# Patient Record
Sex: FEMALE | Race: BLACK OR AFRICAN AMERICAN | NOT HISPANIC OR LATINO | Employment: UNEMPLOYED | URBAN - METROPOLITAN AREA
[De-identification: names, ages, dates, MRNs, and addresses within clinical notes are randomized per-mention and may not be internally consistent; named-entity substitution may affect disease eponyms.]

---

## 2023-12-01 ENCOUNTER — HOSPITAL ENCOUNTER (EMERGENCY)
Facility: HOSPITAL | Age: 23
Discharge: HOME | End: 2023-12-01
Payer: COMMERCIAL

## 2023-12-01 VITALS
BODY MASS INDEX: 34.36 KG/M2 | RESPIRATION RATE: 18 BRPM | OXYGEN SATURATION: 100 % | HEART RATE: 103 BPM | WEIGHT: 240 LBS | SYSTOLIC BLOOD PRESSURE: 127 MMHG | DIASTOLIC BLOOD PRESSURE: 89 MMHG | HEIGHT: 70 IN | TEMPERATURE: 98.6 F

## 2023-12-01 DIAGNOSIS — Z32.01 POSITIVE URINE PREGNANCY TEST (HHS-HCC): Primary | ICD-10-CM

## 2023-12-01 LAB — PREGNANCY TEST URINE, POC: POSITIVE

## 2023-12-01 PROCEDURE — 99283 EMERGENCY DEPT VISIT LOW MDM: CPT

## 2023-12-01 PROCEDURE — 99283 EMERGENCY DEPT VISIT LOW MDM: CPT | Performed by: PHYSICIAN ASSISTANT

## 2023-12-01 RX ORDER — ACETAMINOPHEN 500 MG
1000 TABLET ORAL EVERY 8 HOURS PRN
Qty: 30 TABLET | Refills: 0 | Status: SHIPPED | OUTPATIENT
Start: 2023-12-01 | End: 2023-12-11

## 2023-12-01 ASSESSMENT — COLUMBIA-SUICIDE SEVERITY RATING SCALE - C-SSRS
2. HAVE YOU ACTUALLY HAD ANY THOUGHTS OF KILLING YOURSELF?: NO
6. HAVE YOU EVER DONE ANYTHING, STARTED TO DO ANYTHING, OR PREPARED TO DO ANYTHING TO END YOUR LIFE?: NO
1. IN THE PAST MONTH, HAVE YOU WISHED YOU WERE DEAD OR WISHED YOU COULD GO TO SLEEP AND NOT WAKE UP?: NO

## 2023-12-02 NOTE — ED TRIAGE NOTES
Pt to ED stating she wants a pregnancy test and stating her menstrual cycle is 20 days late. When pt asked why she didn't take a pregnancy test at home, she said she wanted one done here. Pt is denying any current symptoms.

## 2023-12-02 NOTE — ED PROVIDER NOTES
Emergency Department Encounter  [facility] EMERGENCY MEDICINE    Patient: Ena Ridley  MRN: [id number]  : 2000  Date of Evaluation: 2023  ED Provider: Ena Matute PA-C      Chief Complaint       Chief Complaint   Patient presents with    pregnancy test     HPI    Ena Ridley is a 23 y.o. female who presents to the emergency department presenting for pregnancy test.  Patient states that her cycle is approximately 20 days late.  , FDLMP approximately 5 weeks ago.  Patient has not yet taken a home pregnancy test.  She denies any associated abdominal and/or pelvic pain, dysuria or hematuria, changes in urinary frequency or urgency, vaginal bleeding or discharge.    ROS:     Review of Systems  14 systems reviewed and otherwise acutely negative except as in the HPI.    Past History   No past medical history on file.  No past surgical history on file.  Social History     Socioeconomic History    Marital status: Single     Spouse name: Not on file    Number of children: Not on file    Years of education: Not on file    Highest education level: Not on file   Occupational History    Not on file   Tobacco Use    Smoking status: Not on file    Smokeless tobacco: Not on file   Substance and Sexual Activity    Alcohol use: Not on file    Drug use: Not on file    Sexual activity: Not on file   Other Topics Concern    Not on file   Social History Narrative    Not on file     Social Determinants of Health     Financial Resource Strain: Not on file   Food Insecurity: Not on file   Transportation Needs: Not on file   Physical Activity: Not on file   Stress: Not on file   Social Connections: Not on file   Intimate Partner Violence: Not on file   Housing Stability: Not on file       Medications/Allergies     Previous Medications    ACYCLOVIR (ZOVIRAX) 400 MG TABLET    Take 1 tablet (400 mg) by mouth 3 times a day.    BUPROPION XL (WELLBUTRIN XL) 150 MG 24 HR TABLET    Take 1 tablet (150 mg) by  "mouth once daily in the morning. Take before meals.    BUPROPION XL (WELLBUTRIN XL) 300 MG 24 HR TABLET    Take 1 tablet (300 mg) by mouth once daily in the morning. Take before meals.    METOCLOPRAMIDE (REGLAN) 10 MG TABLET    take 1 tablet by mouth four times a day if needed for nausea    VALACYCLOVIR (VALTREX) 500 MG TABLET    Take 1 tablet (500 mg) by mouth once daily.    VENLAFAXINE XR (EFFEXOR-XR) 150 MG 24 HR CAPSULE    Take 2 capsules (300 mg) by mouth once daily.     Allergies   Allergen Reactions    Fish Containing Products Unknown    Nickel Rash        Physical Exam       ED Triage Vitals [12/01/23 1928]   Temp Heart Rate Resp BP   37 °C (98.6 °F) 103 18 127/89      SpO2 Temp src Heart Rate Source Patient Position   100 % -- -- --      BP Location FiO2 (%)     -- --         Physical Exam    Physical Exam:    VS: As documented in the triage note and EMR flowsheet from this visit were reviewed.    Appearance: Alert, oriented, cooperative, in no acute distress. Well nourished & well hydrated.    Skin: Warm and dry.     Neck: Supple.    Pulmonary: Clear bilaterally with good chest wall excursion. No rales, rhonchi or wheezing. No accessory muscle use or stridor.     Cardiac: Normal S1, S2    Abdomen: Soft, nontender, active bowel sounds.     : Exam deferred.    Musculoskeletal: Spontaneously moving all extremities without limitation. Extremities warm and well-perfused.    Diagnostics   Labs:  Results for orders placed or performed during the hospital encounter of 12/01/23   POCT pregnancy, urine   Result Value Ref Range    Preg Test, Ur Positive (A) Negative       ED Course   Visit Vitals  /89   Pulse 103   Temp 37 °C (98.6 °F)   Resp 18   Ht 1.778 m (5' 10\")   Wt 109 kg (240 lb)   SpO2 100%   BMI 34.44 kg/m²   BSA 2.32 m²     Medications - No data to display    Medical Decision Making     Diagnoses as of 12/01/23 2007   Positive urine pregnancy test   Urine preg positive. No other symptoms requiring " further testing. Follow up with OB, Rx for tylenol and daily prenatals.      Final Impression      1. Positive urine pregnancy test          DISPOSITION  Disposition: discharge  Patient condition is: Stable    Comment: Please note this report has been produced using speech recognition software and may contain errors related to that system including errors in grammar, punctuation, and spelling, as well as words and phrases that may be inappropriate.  If there are any questions or concerns please feel free to contact the dictating provider for clarification.    RAJ Stark PA-C  12/01/23 2012

## 2024-03-26 ENCOUNTER — DOCUMENTATION (OUTPATIENT)
Dept: SURGERY | Facility: HOSPITAL | Age: 24
End: 2024-03-26
Payer: COMMERCIAL

## 2024-03-26 ENCOUNTER — TELEPHONE (OUTPATIENT)
Dept: SURGERY | Facility: CLINIC | Age: 24
End: 2024-03-26
Payer: COMMERCIAL

## 2024-03-26 NOTE — TELEPHONE ENCOUNTER
Patient wanting to transfer from  to  for Bariatrics.  Patient will register on line and will call once she completes it.  Insurance verification will be completed at that time

## 2024-04-26 PROBLEM — O36.80X0 PREGNANCY OF UNKNOWN ANATOMIC LOCATION (HHS-HCC): Status: ACTIVE | Noted: 2024-04-26

## 2024-04-26 PROBLEM — F41.9 ANXIETY: Status: ACTIVE | Noted: 2024-04-26

## 2024-04-26 PROBLEM — B00.9 HSV (HERPES SIMPLEX VIRUS) INFECTION: Status: ACTIVE | Noted: 2022-09-23

## 2024-04-26 PROBLEM — O36.5930 POOR FETAL GROWTH AFFECTING MANAGEMENT OF MOTHER IN THIRD TRIMESTER (HHS-HCC): Status: ACTIVE | Noted: 2019-08-02

## 2024-04-26 PROBLEM — F33.1 MODERATE EPISODE OF RECURRENT MAJOR DEPRESSIVE DISORDER (MULTI): Chronic | Status: ACTIVE | Noted: 2022-09-13

## 2024-04-26 PROBLEM — N90.7 LABIAL CYST: Status: ACTIVE | Noted: 2021-11-30

## 2024-04-26 PROBLEM — F32.A DEPRESSION: Status: ACTIVE | Noted: 2019-01-28

## 2024-04-26 PROBLEM — M25.50 JOINT PAIN: Status: ACTIVE | Noted: 2024-04-26

## 2024-04-26 PROBLEM — E66.01 MORBID OBESITY (MULTI): Status: ACTIVE | Noted: 2024-04-26

## 2024-04-26 PROBLEM — Z86.19 HISTORY OF SYPHILIS: Status: ACTIVE | Noted: 2021-11-30

## 2024-04-26 PROBLEM — E28.2 PCOS (POLYCYSTIC OVARIAN SYNDROME): Status: ACTIVE | Noted: 2024-04-26

## 2024-04-26 PROBLEM — R45.851 SUICIDAL IDEATION: Status: ACTIVE | Noted: 2023-06-05

## 2024-04-26 NOTE — PROGRESS NOTES
Subjective     Date: 5/3/2024 Time: 9:11 AM  Name: Ena Ridley  MRN: [id number]    This is a 24 y.o. female with morbid obesity (Body mass index is 40.65 kg/m².) who presents to clinic for consideration of bariatric surgery. she has attempted and failed multiple diet and exercise regimens for weight loss. Initial Onset of obesity was in childhood, and after pregnancy 4yrs ago.  Their goal for surgery is to  be healthier  and lose weight. The patient has tried multiple diets to lose weight including Medications , Low Calorie, and pediatric weight loss program 12-13yo . The patient was most successful with the  pediatric WL program . The most pounds lost on this diet were 10 lbs. The patient considers their dietary weakness to be  none; lack of exercise  The patient reports a  highest weight ever of 285 pounds and lowest weight ever of 180 pounds Distribution of Obesity: is general. The patient exercises daily  45-50 Minutes/Day Types of Exercise : walking      Patient Active Problem List   Diagnosis    Positive urine pregnancy test (HHS-HCC)    Anxiety    Depression    History of syphilis    HSV (herpes simplex virus) infection    Joint pain    Labial cyst    Moderate episode of recurrent major depressive disorder (Multi)    Morbid obesity (Multi)    PCOS (polycystic ovarian syndrome)    Poor fetal growth affecting management of mother in third trimester (HHS-HCC)    Pregnancy of unknown anatomic location (HHS-HCC)    Suicidal ideation    Psychological factors affecting medical condition    Preop examination       SLEEVE Gastric Surgery For Morbid Obesity Laparoscopic Longitudinal Gastrectomy     0 = No symptoms    PMH: No past medical history on file.     PSH:   Past Surgical History:   Procedure Laterality Date    NO PAST SURGERIES          denies personal/family hx of VTE.    FAMILY HISTORY:  No family history on file.     SOCIAL HISTORY:  Social History     Tobacco Use    Smoking status: Never    Smokeless  tobacco: Never   Vaping Use    Vaping status: Never Used   Substance Use Topics    Alcohol use: Not Currently    Drug use: Never       MEDICATIONS:  Prior to Admission Medications:  Medication Documentation Review Audit       Reviewed by Haley Kent CMA (Medical Assistant) on 05/03/24 at 0848      Medication Order Taking? Sig Documenting Provider Last Dose Status   acyclovir (Zovirax) 400 mg tablet [id number] Yes Take 1 tablet (400 mg) by mouth 3 times a day. John Hines MD Taking Active   buPROPion XL (Wellbutrin XL) 150 mg 24 hr tablet [id number] No Take 1 tablet (150 mg) by mouth once daily in the morning. Take before meals. John Hines MD Not Taking Active   buPROPion XL (Wellbutrin XL) 300 mg 24 hr tablet [id number] No Take 1 tablet (300 mg) by mouth once daily in the morning. Take before meals. John Hines MD Not Taking Active   cyanocobalamin (Vitamin B-12) 1,000 mcg tablet [id number] Yes Take 1 tablet (1,000 mcg) by mouth once daily. Historical MD Flora Taking Active   Daily-Briana, with folic acid, 400 mcg tablet [id number] Yes  Historical MD Flora Taking Active   docusate sodium (Colace) 100 mg capsule [id number] Yes Take 1 capsule (100 mg) by mouth once daily. Historical MD Flora Taking Active   ferrous sulfate ER (Slow Iron) 140 (45 Fe) MG ER tablet [id number] Yes Take 140 mg by mouth once daily. Historical MD Flora Taking Active   fluconazole (Diflucan) 150 mg tablet [id number] No  Historical MD Flora Not Taking Active   levonorgestrel (Plan B) 1.5 mg tablet [id number] No TAKE 1 TABLET BY MOUTH ONCE FOR 1 DOSE, TAKE ORALLY WITH 72 HOURS OF UNPROTECTED INTERCOURSE Historical MD Flora Not Taking Active   lidocaine 4 % patch [id number] No  Historical MD Flora Not Taking Active   medroxyPROGESTERone 150 mg/mL injection [id number] Yes Inject 1 mL (150 mg) into the muscle. Historical MD Flora Taking Active   metoclopramide (Reglan) 10 mg tablet [id number] No take 1  "tablet by mouth four times a day if needed for nausea Historical Provider, MD Not Taking Active   tretinoin (Retin-A) 0.025 % cream [id number] Yes Apply topically. Historical Provider, MD Taking Active   valACYclovir (Valtrex) 500 mg tablet [id number] Yes Take 1 tablet (500 mg) by mouth once daily. Historical Provider, MD Taking Active   venlafaxine XR (Effexor-XR) 150 mg 24 hr capsule [id number] No Take 2 capsules (300 mg) by mouth once daily. Historical Provider, MD Not Taking Active                     ALLERGIES:  Allergies   Allergen Reactions    Fish Containing Products Unknown    Nickel Rash       REVIEW OF SYSTEMS:  GENERAL: Negative for malaise, significant weight loss and fever  HEAD: Negative for headache, swelling.  NECK: Negative for lumps, goiter, pain and significant neck swelling  RESPIRATORY: Negative for cough, wheezing or shortness of breath.  CARDIOVASCULAR: Negative for chest pain, leg swelling or palpitations.  GI: Negative for abdominal discomfort, blood in stools or black stools or change in bowel habits  : No history of dysuria, frequency or incontinence  MUSCULOSKELETAL: Negative for joint pain or swelling, back pain or muscle pain.  SKIN: Negative for lesions, rash, and itching.  PSYCH: Negative for sleep disturbance, mood disorder and recent psychosocial stressors.  ENDOCRINE: Negative for cold or heat intolerance, polyuria, polydipsia and goiter.    Objective   PHYSICAL EXAM:  Visit Vitals  /80 (BP Location: Left arm, Patient Position: Sitting, BP Cuff Size: Large adult)   Pulse 86   Ht 1.765 m (5' 9.5\")   Wt 127 kg (279 lb 4.8 oz)   SpO2 98%   BMI 40.65 kg/m²   OB Status Injection   Smoking Status Never   BSA 2.5 m²     General appearance: obese, NAD  Neuro: AOx3  Head: EOMI; no swelling or lesions of scalp or face  ENT:  no lumps or lymphadenopathy, thyroid normal to palpation; oropharynx clear, no swelling or erythema  Skin: warm, no erythema or rashes  Lungs: clear to percussion " and auscultation  Heart: regular rhythm and S1, S2 normal  Abdomen: soft, non-tender, no masses, no organomegaly  Extremities: Normal exam of the extremities. No swelling or pain.  Psych: no hurried speech, no flight of ideas, normal affect    Assessment/Plan   IMPRESSION:  Ena Ridley is a 24 y.o. female with a BMI of Body mass index is 40.65 kg/m². with the following diagnoses and co-morbidities:     No past medical history on file.    This patient does meet the criteria for a surgical weight loss procedure according to NIH guidelines.    The risks of sleeve gastrectomy, Je-en-Y gastric bypass including but not limited to bleeding, leak along staple lines, infection, dehydration, ulcers, internal hernia, DVT/PE, pneumonia, myocardial infarction, prolonged nausea/vomiting, incomplete resolution of associated medical conditions, reflux, weight regain, vitamin/mineral deficiencies, and death have been explained to the patient and Ena Ridley has expressed understanding and acceptance of them.       She wants to proceed with SG.       We discussed the lifestyle changes necessary to be successful following surgery.    The increased risk of substance and alcohol abuse following bariatric surgery was discussed with the patient, along with the negative consequences of substance/alcohol use after surgery including addiction, worsening of mental health disorders, and injury to the stomach. The risk of smoking and vaping (tobacco or any other substance) after bariatric surgery was explained to the patient. This includes risk of anastamotic ulcers, gastritis, bleeding, perforation, stricture, and PO intolerance.  The patient expressed understanding and acceptance of these risks.    The patient was advised not to become pregnant within 12-18 months following bariatric surgery. She was educated on the increased risks to mother and fetus associated with pregnancy within 2 years of bariatric surgery.    The benefits of the  above surgeries including weight loss, improvement/resolution of associated medical and mental health conditions, improved mobility, and decreased mortality have been explained the the patient and Ena Ridley has expressed understanding and acceptance of them.      PLAN:  The plan of treatment for Ena Ridley is to continue with the consultations and tests ordered today in hopes of qualifying for pre-operative clearance for bariatric surgery. This includes:    Consult Nutrition for education and monthly visits or MSWL classes   Consult Psychology - your clearance will  24  Consult Cardiology   Consult Pulmonology  Labs ordered -some labs were not ordered by [facility]   EGD -completed 24  PCP for medical optimization  Consult sleep medicine - concern for NICHOLAS  Recommend at least 5-10 lbs of weight loss prior to surgery.

## 2024-04-26 NOTE — PATIENT INSTRUCTIONS
The following are some lifestyle changes you should begin to prepare you for your surgery.   Eliminate soda and other carbonated beverages from your diet. Carbonation will not be well tolerated after surgery. Try Propel, Vitamin Water Zero, Sobe Lifewater, Crystal Light or water.    Increase fluid consumption to 64 oz daily. Do not drink within 30 minutes of eating as this will liquefy your food and make you hungry more quickly.    Exercise for 30-60 minutes daily. Brisk walking, bike riding and swimming are all examples of healthy exercise. If you are unable to exercise we recommend seated exercise.    Do not skip meals.    Take a multivitamin daily.    Lose weight. In preparation for your surgery it is important that you begin making healthier food choices now. Our dietitian will meet with you to help you select foods lower in calories and higher in nutrition. We would like you to lose at least 5-10 lbs prior to surgery.     Increase your protein intake to 60 grams per day.    Alcohol is empty calories. Please eliminate while preparing for surgery.    Plan your meals.      General Instruction:   1) Use the information we gave you today to work through your insurance requirements and medical clearances.   2) These documents need to get faxed or emailed to the program navigators so they can submit them for approval from your insurance company.   3) Obtain labs today at a  facility. We will call you with any abnormalities and corrections you need to make.   4) Continue to work with your primary care doctor and other specialist so your other health problems are well controlled prior to your surgery.   5) Adopt the recommendations of the program dietician so you develop healthy eating patterns.   6) Work with the sleep team to get your sleep apnea treated to prevent other health problems .   7) Consider attending a support group to learn from other who have been through the process.   8) Come to the  MSWL sessions.      back/upper

## 2024-04-29 PROBLEM — Z01.818 PREOP EXAMINATION: Status: ACTIVE | Noted: 2024-04-29

## 2024-04-29 PROBLEM — F54 PSYCHOLOGICAL FACTORS AFFECTING MEDICAL CONDITION: Status: ACTIVE | Noted: 2024-04-29

## 2024-05-03 ENCOUNTER — LAB (OUTPATIENT)
Dept: LAB | Facility: LAB | Age: 24
End: 2024-05-03
Payer: COMMERCIAL

## 2024-05-03 ENCOUNTER — OFFICE VISIT (OUTPATIENT)
Dept: SURGERY | Facility: CLINIC | Age: 24
End: 2024-05-03
Payer: COMMERCIAL

## 2024-05-03 ENCOUNTER — NUTRITION (OUTPATIENT)
Dept: SURGERY | Facility: CLINIC | Age: 24
End: 2024-05-03
Payer: COMMERCIAL

## 2024-05-03 VITALS
OXYGEN SATURATION: 98 % | HEIGHT: 70 IN | WEIGHT: 279.3 LBS | HEART RATE: 86 BPM | BODY MASS INDEX: 39.99 KG/M2 | DIASTOLIC BLOOD PRESSURE: 80 MMHG | SYSTOLIC BLOOD PRESSURE: 114 MMHG

## 2024-05-03 DIAGNOSIS — Z71.3 PRE-BARIATRIC SURGERY NUTRITION EVALUATION: ICD-10-CM

## 2024-05-03 DIAGNOSIS — E66.01 MORBID (SEVERE) OBESITY DUE TO EXCESS CALORIES (MULTI): ICD-10-CM

## 2024-05-03 DIAGNOSIS — Z98.84 BARIATRIC SURGERY STATUS: ICD-10-CM

## 2024-05-03 DIAGNOSIS — Z13.21 ENCOUNTER FOR VITAMIN DEFICIENCY SCREENING: ICD-10-CM

## 2024-05-03 DIAGNOSIS — E66.01 MORBID OBESITY WITH BMI OF 40.0-44.9, ADULT (MULTI): Primary | ICD-10-CM

## 2024-05-03 DIAGNOSIS — E28.2 PCOS (POLYCYSTIC OVARIAN SYNDROME): ICD-10-CM

## 2024-05-03 DIAGNOSIS — E66.01 MORBID OBESITY (MULTI): ICD-10-CM

## 2024-05-03 PROCEDURE — 36415 COLL VENOUS BLD VENIPUNCTURE: CPT

## 2024-05-03 PROCEDURE — 84630 ASSAY OF ZINC: CPT

## 2024-05-03 PROCEDURE — 80323 ALKALOIDS NOS: CPT

## 2024-05-03 PROCEDURE — 99204 OFFICE O/P NEW MOD 45 MIN: CPT | Performed by: SURGERY

## 2024-05-03 PROCEDURE — 3008F BODY MASS INDEX DOCD: CPT | Performed by: SURGERY

## 2024-05-03 PROCEDURE — 84590 ASSAY OF VITAMIN A: CPT

## 2024-05-03 PROCEDURE — 1036F TOBACCO NON-USER: CPT | Performed by: SURGERY

## 2024-05-03 PROCEDURE — 99214 OFFICE O/P EST MOD 30 MIN: CPT | Performed by: SURGERY

## 2024-05-03 PROCEDURE — 82525 ASSAY OF COPPER: CPT

## 2024-05-03 NOTE — PROGRESS NOTES
Surgeon: Vidya  Patient is considering:   sleeve    ASSESSMENT:  Current weight: 279.0  Ht: 70.0  in  BMI:  40.65        Initial start weight:   275.0   9/1/23  at   Pre-Op Excess Body Weight (EBW):    105.0  Target Post-Op weight goal:     210.7-226.5    Food allergies/intolerances: +[lactose and eggs intolerant  Chewing/Swallowing/Dentition:  none   Nausea / Vomiting / Hx Gastroparesis:   none  Diarrhea/ Constipation:   none  Exercise level:  walking for 40-50 min 5x/week  Smoking/Tobacco use: none  Vitamins/Minerals supplements:   MVI, Fe, B12  Past diet attempts:  Adipex, low calorie, RD monitored. Adolescent weight loss program  Hours of sleep/night:  7    24 HOUR RECALL/DIET HISTORY:  Breakfast:   turkey and cheese  Snack:   mandarin oranges  Lunch:   kale salad w/grilled veggies  Snack:   none  Dinner:  none  Snack:  none  Beverages:   64 oz water/day  Alcohol:  none    Person responsible for cooking & shopping?   Pt and grandma do both  How often do you eat sweet snacks?  2 mini chocolates 1-2x/week  How often do you eat savory snacks?    3x/week eats pork rinds  How often do you eat out?    1x/month  Do you feel overly stuffed?   no  Binge Eating?    no  Night Eating?  Yes, wakes up around 2 and eat pork rinds.  Emotional Eating?    no       READINESS TO LEARN:  Motivation to learn: Interested        Understanding of instruction: Good   Anticipated Compliance: Good      Family Support: Pt states mom and grandma are supportive.        Educational Materials Provided:    Pre-op Diet and sample menus                                             Nutrition Guidelines for Gastric Bypass and Sleeve Gastrectomy   Schedules for MSWL class and support group   1500 Calorie meal plan   Goals sheet      Nutrition assessment completed today.  Pt will be scheduled for a video education class at a later date to discuss the 2 week pre op diet, post op protein and fluid goals, vitamin and mineral supplementation, exercise  goals, and post op diet progression.     Patient is seekingsleeve gastrectomy  surgery    Pt has transferred over from [facility] program.  Her start weight was 275  pounds on 9/1/23 and her last weight was 273 pounds on 3/26/24.      Pt is unemployed.   She has had a weight issues since childhood.  She lives with her mother and her son  Pt has completed 6 months of medically supervised weight loss.  Her weight at her last visit was 273 on 3/26/24    Review of her diet shows skipped meals, low intake  of protein, fruit and vegetables.     REcommend following  1500 calorie meal plan.  Recommend eating 3 meals that include 3-4 oz protein and 1-2 high protein snacks . Discussed meal and snack ideas.   Reviewed the postop behaviors to start practicing.  Set a goal to add some strength and toning exercises to her routine.     Patient was receptive to nutritional recommendations, asked numerous questions, and verbalized understanding of the weight loss surgery diet.  Patient expressed understanding about the importance of strict dietary compliance post-surgery to avoid nutritional deficiencies and achieve optimal weight loss and verbalized intent to follow dietary recommendations.    Malnutrition Screening:  Significant unintentional weight loss? n/a   Eating less than 75% of usual intake for more than 2 weeks? n/a      Nutrition Diagnosis:   1. Overweight/obesity related to excess energy intake as evidenced by BMI = 40 kg/m^2.  2. Food- and nutrition-related knowledge deficit related to lack of prior exposure to surgical weight loss information as evidenced by pt new to surgical program.    Nutrition Interventions:   1. Modify type and amount of food and nutrients within meals and snacks.  2. Comprehensive Nutrition Education    Recommendations:  1. Begin following your meal plan.  Measure and record intake daily.   2. Structure meal patterns, eating three meals and 1-2 snacks per day.  3. Aim for 3-4 oz protein  per meal.  Have 1-2 high protein snacks that are 10-20 g protein each.  You can try a tuna or chicken packet, Greek yogurt, 2 string cheeses, Protein bars like Quest, Pure Protein, Premier, or Built Bars. you can also try protein chips form Quest or Atkins.    4. Drink 64oz of calorie-free, caffeine-free, and non-carbonated beverages.   5. Practice no drinking 30 minutes before meals, nothing with meals and wait 30 minutes after meals to drink again. Make meals last 30 minutes-chew thoroughly.   6. Limit or omit eating out/sweets/savory snacks to 1-2 times per week.  7. Increase physical activity by 10-15 minutes as tolerated to an end goal of 60 minutes 5 x per week. Consistency is the key.  Try some of the exercise videos in your emial.   9. Start the pre-op diet 2 weeks before surgery and follow the post-op diet progression after surgery    Pre-op Goal weight: lose 5% of body weight    Nutrition Monitoring and Evaluation: 1-2 pound weight loss per week  Criteria: weight check  Need for Follow-up:     Patient does meet National Institutes Health guidelines for weight loss surgery, however needs to demonstrate consistent effort in making dietary changes before giving clearance. It is anticipated that the patient will need at least 1-2  nutritional follow-up visits prior to clearance for surgery.

## 2024-05-06 LAB
COPPER SERPL-MCNC: 116.8 UG/DL (ref 80–155)
ZINC SERPL-MCNC: 55.7 UG/DL (ref 60–120)

## 2024-05-07 ENCOUNTER — APPOINTMENT (OUTPATIENT)
Dept: CARDIOLOGY | Facility: CLINIC | Age: 24
End: 2024-05-07
Payer: COMMERCIAL

## 2024-05-07 PROBLEM — Z98.84 HISTORY OF BARIATRIC SURGERY: Status: ACTIVE | Noted: 2024-05-07

## 2024-05-07 PROBLEM — O03.9 SPONTANEOUS ABORTION (HHS-HCC): Status: ACTIVE | Noted: 2024-05-07

## 2024-05-07 PROBLEM — U07.1 DISEASE DUE TO SEVERE ACUTE RESPIRATORY SYNDROME CORONAVIRUS 2 (SARS-COV-2): Status: ACTIVE | Noted: 2022-09-23

## 2024-05-08 LAB
ANNOTATION COMMENT IMP: NORMAL
RETINYL PALMITATE SERPL-MCNC: <0.02 MG/L (ref 0–0.1)
VIT A SERPL-MCNC: 0.4 MG/L (ref 0.3–1.2)

## 2024-05-09 ENCOUNTER — OFFICE VISIT (OUTPATIENT)
Dept: SLEEP MEDICINE | Facility: CLINIC | Age: 24
End: 2024-05-09
Payer: COMMERCIAL

## 2024-05-09 DIAGNOSIS — R53.83 FATIGUE, UNSPECIFIED TYPE: ICD-10-CM

## 2024-05-09 DIAGNOSIS — E66.01 MORBID OBESITY (MULTI): ICD-10-CM

## 2024-05-09 DIAGNOSIS — Z98.84 BARIATRIC SURGERY STATUS: ICD-10-CM

## 2024-05-09 DIAGNOSIS — Z01.818 PREOPERATIVE CLEARANCE: ICD-10-CM

## 2024-05-09 DIAGNOSIS — G47.19 EXCESSIVE DAYTIME SLEEPINESS: Primary | ICD-10-CM

## 2024-05-09 PROBLEM — D50.9 IRON DEFICIENCY ANEMIA: Status: ACTIVE | Noted: 2024-05-09

## 2024-05-09 LAB
COTININE SERPL-MCNC: <5 NG/ML
NICOTINE SERPL-MCNC: <5 NG/ML

## 2024-05-09 PROCEDURE — 3008F BODY MASS INDEX DOCD: CPT | Performed by: PSYCHIATRY & NEUROLOGY

## 2024-05-09 PROCEDURE — G2211 COMPLEX E/M VISIT ADD ON: HCPCS | Performed by: PSYCHIATRY & NEUROLOGY

## 2024-05-09 PROCEDURE — 99204 OFFICE O/P NEW MOD 45 MIN: CPT | Performed by: PSYCHIATRY & NEUROLOGY

## 2024-05-09 PROCEDURE — 1036F TOBACCO NON-USER: CPT | Performed by: PSYCHIATRY & NEUROLOGY

## 2024-05-09 ASSESSMENT — SLEEP AND FATIGUE QUESTIONNAIRES
HOW LIKELY ARE YOU TO NOD OFF OR FALL ASLEEP WHILE SITTING QUIETLY AFTER LUNCH WITHOUT ALCOHOL: WOULD NEVER DOZE
HOW LIKELY ARE YOU TO NOD OFF OR FALL ASLEEP WHILE SITTING AND READING: HIGH CHANCE OF DOZING
HOW LIKELY ARE YOU TO NOD OFF OR FALL ASLEEP WHILE SITTING AND TALKING TO SOMEONE: WOULD NEVER DOZE
ESS-CHAD TOTAL SCORE: 11
HOW LIKELY ARE YOU TO NOD OFF OR FALL ASLEEP IN A CAR, WHILE STOPPED FOR A FEW MINUTES IN TRAFFIC: WOULD NEVER DOZE
HOW LIKELY ARE YOU TO NOD OFF OR FALL ASLEEP WHEN YOU ARE A PASSENGER IN A CAR FOR AN HOUR WITHOUT A BREAK: MODERATE CHANCE OF DOZING
HOW LIKELY ARE YOU TO NOD OFF OR FALL ASLEEP WHILE WATCHING TV: HIGH CHANCE OF DOZING
SITING INACTIVE IN A PUBLIC PLACE LIKE A CLASS ROOM OR A MOVIE THEATER: WOULD NEVER DOZE
HOW LIKELY ARE YOU TO NOD OFF OR FALL ASLEEP WHILE LYING DOWN TO REST IN THE AFTERNOON WHEN CIRCUMSTANCES PERMIT: HIGH CHANCE OF DOZING

## 2024-05-09 ASSESSMENT — PATIENT HEALTH QUESTIONNAIRE - PHQ9
2. FEELING DOWN, DEPRESSED OR HOPELESS: NOT AT ALL
SUM OF ALL RESPONSES TO PHQ9 QUESTIONS 1 AND 2: 0
1. LITTLE INTEREST OR PLEASURE IN DOING THINGS: NOT AT ALL

## 2024-05-09 NOTE — PROGRESS NOTES
Patient: Ena Ridley    [id number]  : 2000 -- AGE 24 y.o.    Provider: Nirmal Sparks MD     Location [facility]   Service Date: 2024              [facility]  New Visit Note      Virtual or Telephone Consent  An interactive audio and video telecommunication system which permits real time communications between the patient (at the originating site) and provider (at the distant site) was utilized to provide this telehealth service.   Verbal consent was requested and obtained from Ena Ridley on this date, 24 for a telehealth visit.   I verified the patient's identity and physical location in [place].  If this is a new patient to me, I informed the patient of my name and type of active [place] license that I hold.      The patient's referring provider is: Gabriel Dasilva MD     HPI:  Ena Ridley is a 24 y.o. female with PMH notable for morbid obesity, iron deficiency anemia, PCOS, COVID-19, anxiety, and depression, who is going through the bariatric surgery process and is here today as a referral for surgical clearance.     Her bariatric surgeon ordered a split night PSG on 24. It has not been scheduled yet.    She has no sleep complaints, but has had sleepiness and fatigue for the last few years. No worsening over time or known exacerbating/relieving factors. Thinks may be related to her PCOS and iron deficiency. Prone to dozing if sitting down and is mentally idle.    NIGHTTIME SYMPTOMS:   Snoring: No  Witnessed apnea: No  Nocturnal gasping: No  Nocturnal choking: No  Sleep walking: No  Sleep talking:  Yes  Dream enactment: No  Nocturnal GERD: No  Morning headaches: No  Morning dry mouth/sore throat: No  Nocturia: No  Restless sleep: No, but not restorative  Sleep paralysis: No  Hypnagogic/hypnopompic hallucinations: No    DAYTIME SYMPTOMS  Shell:   Daytime sleepiness: Frequent  Fatigue: Yes  Feeling sleepy while driving: Denies    RLS symptoms: No    Cataplexy: No    SLEEP HABITS:   Preferred sleep position: left side  Bedtime: 11 pm, sleep latency 5-10 minutes  Wake time: 830-9 am  # of nocturnal awakenings: generally none   Napping: a few times per week for 2-3 hours. Napping is refreshing  Total estimated sleep per 24 hrs: 9-12 hours    PRIOR SLEEP STUDIES:  Had a sleep study around  - reportedly negative for sleep apnea. Study done because she was overweight.    PRIOR TREATMENTS:  No stimulants or sleep aids.    Patient Active Problem List   Diagnosis    Positive urine pregnancy test ([facility])    Anxiety    Depression    History of syphilis    HSV (herpes simplex virus) infection    Joint pain    Labial cyst    Moderate episode of recurrent major depressive disorder ([facility])    Morbid obesity ([facility])    PCOS (polycystic ovarian syndrome)    Poor fetal growth affecting management of mother in third trimester ([facility])    Pregnancy of unknown anatomic location ([facility])    Suicidal ideation    Psychological factors affecting medical condition    Preop examination    History of bariatric surgery    Disease due to severe acute respiratory syndrome coronavirus 2 (SARS-CoV-2)    Spontaneous  ([facility])     Past Surgical History:   Procedure Laterality Date    NO PAST SURGERIES       Current Outpatient Medications   Medication Sig Dispense Refill    acyclovir (Zovirax) 400 mg tablet Take 1 tablet (400 mg) by mouth 3 times a day.      buPROPion XL (Wellbutrin XL) 150 mg 24 hr tablet Take 1 tablet (150 mg) by mouth once daily in the morning. Take before meals.      buPROPion XL (Wellbutrin XL) 300 mg 24 hr tablet Take 1 tablet (300 mg) by mouth once daily in the morning. Take before meals.      cyanocobalamin (Vitamin B-12) 1,000 mcg tablet Take 1 tablet (1,000 mcg) by mouth once daily.      Daily-Briana, with folic acid, 400 mcg tablet       docusate sodium (Colace) 100 mg capsule Take 1 capsule (100 mg) by mouth once daily.      ferrous sulfate ER (Slow  "Iron) 140 (45 Fe) MG ER tablet Take 140 mg by mouth once daily.      fluconazole (Diflucan) 150 mg tablet       levonorgestrel (Plan B) 1.5 mg tablet TAKE 1 TABLET BY MOUTH ONCE FOR 1 DOSE, TAKE ORALLY WITH 72 HOURS OF UNPROTECTED INTERCOURSE      lidocaine 4 % patch       medroxyPROGESTERone 150 mg/mL injection Inject 1 mL (150 mg) into the muscle.      metoclopramide (Reglan) 10 mg tablet take 1 tablet by mouth four times a day if needed for nausea      tretinoin (Retin-A) 0.025 % cream Apply topically.      valACYclovir (Valtrex) 500 mg tablet Take 1 tablet (500 mg) by mouth once daily.      venlafaxine XR (Effexor-XR) 150 mg 24 hr capsule Take 2 capsules (300 mg) by mouth once daily.       No current facility-administered medications for this visit.     Allergies   Allergen Reactions    Fish Containing Products Unknown    Nickel Rash       FAMILY HISTORY OF SLEEP DISORDERS: None that she knows of.  Family History   Problem Relation Name Age of Onset    Hypertension Mother      Obesity Mother      Heart disease Father         SOCIAL HISTORY  Employment: currently unemployed  Lives with: son and mother  Alcohol: none  Cigarettes: never  Illicits: none  Caffeine: none     ROS: 12 point ROS positive only for fatigue and blurred vision - seeing an eye doctor for new glasses next week.    PHYSICAL EXAMINATION:   Height 5' 9\", approximate weight 275 lbs  General: Awake. Alert. Comfortable. No apparent distress.   Speech: Normal  Comprehension: Normal  Mood: Stable  Affect: Appropriate  Eyes:   Eyelids: normal            ENT:          Unable to assess patency of nasal passageways or for presence of nasal septum deviation. . Tongue scalloping is not present, tongue is enlarged, soft palate is not elongated, hard palate is not high arched. Uvula is not enlarged. Retrognathia is not present. Tonsils are not enlarged. Dentition excellent.           Neck:          Circumference: increased in caliber  Cardiac: unable to " assess pulses or cardiac rate/rhythm.   Pul:          Normal respiratory effort   Abd:         obese  Neuro: Alert, well-oriented. Cranial nerves II-XII grossly normal and symmetric.   No abnormal movements noted.      LABS/DIAGNOSTICS:  Lab Results   Component Value Date    HGB 10.9 (L) 08/21/2022    CO2 23 08/21/2022    TSH 0.66 08/24/2022    HGBA1C 5.1 02/22/2024        Echo: none on file    PFTs: none on file      ASSESSMENT AND PLAN: Ms. Ena Ridley is a 24 y.o. female with a history of morbid obesity, iron deficiency anemia, PCOS, COVID-19, anxiety, and depression, who is going through the bariatric surgery process. Her sleep-related complaint is daytime sleepiness/fatigue, which could be related to her anemia, depression, possibly her PCOS, and possibly she has sleep apnea. Due to the perioperative risks associated with untreated sleep apnea, we will have her follow through with sleep testing. She is agreeable to using CPAP if needed.    #Excessive daytime sleepiness  #fatigue  #morbid obesity  #preoperative clearance  #bariatric surgery status  -We discussed the risk factors for sleep apnea, pathophysiology of sleep apnea, treatment options, and potential long-term complications of untreated NICHOLAS, including cardiovascular and metabolic complications. We will start evaluation with a split night in-lab sleep test. Her surgeon ordered it - she will call to schedule it      All of the above was discussed with the patient in detail. She voiced an understanding of the above and was agreeable to proceed further as advised. Procedure for the sleep study was discussed with her.    FOLLOW UP:  After study to discuss results - pt to schedule her sleep study then will schedule with us for follow-up 2-3 weeks after the study for results/next steps.

## 2024-05-31 ENCOUNTER — OFFICE VISIT (OUTPATIENT)
Dept: CARDIOLOGY | Facility: CLINIC | Age: 24
End: 2024-05-31
Payer: COMMERCIAL

## 2024-05-31 VITALS
OXYGEN SATURATION: 97 % | WEIGHT: 282 LBS | DIASTOLIC BLOOD PRESSURE: 90 MMHG | BODY MASS INDEX: 38.19 KG/M2 | HEIGHT: 72 IN | HEART RATE: 94 BPM | SYSTOLIC BLOOD PRESSURE: 128 MMHG

## 2024-05-31 DIAGNOSIS — Z01.810 PREOPERATIVE CARDIOVASCULAR EXAMINATION: ICD-10-CM

## 2024-05-31 DIAGNOSIS — R06.02 SHORTNESS OF BREATH: Primary | ICD-10-CM

## 2024-05-31 DIAGNOSIS — Z98.84 BARIATRIC SURGERY STATUS: ICD-10-CM

## 2024-05-31 PROCEDURE — 93000 ELECTROCARDIOGRAM COMPLETE: CPT | Performed by: STUDENT IN AN ORGANIZED HEALTH CARE EDUCATION/TRAINING PROGRAM

## 2024-05-31 PROCEDURE — 3008F BODY MASS INDEX DOCD: CPT | Performed by: STUDENT IN AN ORGANIZED HEALTH CARE EDUCATION/TRAINING PROGRAM

## 2024-05-31 PROCEDURE — 99204 OFFICE O/P NEW MOD 45 MIN: CPT | Performed by: STUDENT IN AN ORGANIZED HEALTH CARE EDUCATION/TRAINING PROGRAM

## 2024-05-31 NOTE — PROGRESS NOTES
Referred by Dr. Dasilva for New Patient Visit (Cardiac Clearance, Bariatric Surgery)     History Of Present Illness:    Ena Ridley is a 24 y.o. female presents to clinic for cardiovascular evaluation for planned bariatric surgery.  Patient is undergoing evaluation for bariatric sleeve and is here for cardiovascular evaluation.  She denies symptoms of chest pain and occasionally has some shortness of breath if she goes up multiple flights of stairs.  She has no prior history of cardiovascular disease.  No history of diabetes.  Family history of heart disease apparently her father had heart surgery at a early age.  She is never had surgeries before.  No previous complications.  Baseline ECG of normal sinus rhythm normal axis and appropriately progression.      Past Medical History:  She has no past medical history on file.    Past Surgical History:  She has a past surgical history that includes No past surgeries.      Social History:  She reports that she has never smoked. She has never used smokeless tobacco. She reports that she does not currently use alcohol. She reports that she does not use drugs.    Family History:  Family History   Problem Relation Name Age of Onset    Hypertension Mother      Obesity Mother      Heart disease Father          Allergies:  Egg, Fish containing products, and Nickel    Outpatient Medications:  Current Outpatient Medications   Medication Instructions    acyclovir (ZOVIRAX) 400 mg, oral, 3 times daily    cyanocobalamin (VITAMIN B-12) 1,000 mcg, oral, Daily RT    Daily-Briana, with folic acid, 400 mcg tablet     docusate sodium (COLACE) 100 mg, oral, Daily RT    ferrous sulfate ER (SLOW IRON) 140 mg, oral, Daily RT    fluconazole (Diflucan) 150 mg tablet     levonorgestrel (Plan B) 1.5 mg tablet TAKE 1 TABLET BY MOUTH ONCE FOR 1 DOSE, TAKE ORALLY WITH 72 HOURS OF UNPROTECTED INTERCOURSE    lidocaine 4 % patch     medroxyPROGESTERone (DEPO-PROVERA) 150 mg, intramuscular    metoclopramide  (Reglan) 10 mg tablet take 1 tablet by mouth four times a day if needed for nausea    tretinoin (Retin-A) 0.025 % cream Topical    valACYclovir (VALTREX) 500 mg, oral, Daily RT    venlafaxine XR (EFFEXOR-XR) 300 mg, oral, Daily        Last Recorded Vitals:  Vitals:    05/31/24 1107   BP: 128/90   BP Location: Left arm   Patient Position: Sitting   BP Cuff Size: Adult   Pulse: 94   SpO2: 97%   Weight: 128 kg (282 lb)   Height: 1.829 m (6')       Physical Exam:  General: No acute distress,  A&O x3  Skin: Warm and dry  Neck: JVD is not elevated  ENT: Moist mucous membranes no lesions appreciated  Pulmonary: CTAB  Cards: Regular rate rhythm, no murmurs gallops or rubs appreciated normal S1-S2  Abdomen: Soft nontender nondistended  Extremities: No edema or cyanosis  Psych: Appropriate mood and affect     Last Labs:  CBC -  Lab Results   Component Value Date    WBC 8.1 08/21/2022    HGB 10.9 (L) 08/21/2022    HCT 35.5 (L) 08/21/2022    MCV 77 (L) 08/21/2022     08/21/2022       CMP -  Lab Results   Component Value Date    CALCIUM 9.0 08/21/2022    PROT 7.5 08/21/2022    ALBUMIN 3.9 08/21/2022    AST 11 08/21/2022    ALT 7 08/21/2022    ALKPHOS 75 08/21/2022    BILITOT 0.2 08/21/2022       LIPID PANEL -   Lab Results   Component Value Date    CHOL 103 08/24/2022    TRIG 27 (LL) 08/24/2022    HDL 41.3 08/24/2022    CHHDL 2.5 08/24/2022    LDLF 56 08/24/2022    VLDL 5 08/24/2022    NHDL 62 08/24/2022       RENAL FUNCTION PANEL -   Lab Results   Component Value Date    GLUCOSE 115 (H) 08/21/2022     08/21/2022    K 3.8 08/21/2022     08/21/2022    CO2 23 08/21/2022    ANIONGAP 13 08/21/2022    BUN 11 08/21/2022    CREATININE 0.65 08/21/2022    CALCIUM 9.0 08/21/2022    ALBUMIN 3.9 08/21/2022        Lab Results   Component Value Date    HGBA1C 5.1 02/22/2024       Last Cardiology Tests:  ECG:  No results found for this or any previous visit from the past 1095 days.    Assessment/Plan     1.  Cardiovascular  risk assessment for planned gastric sleeve.  RCRI score of 0.  Baseline ECG is sinus rhythm normal axis and poor probably progression.  Able to achieve greater than 4 METS of activity at baseline.  Mild shortness of breath symptoms.  Echocardiogram ordered.  Consequently, patient's risk of major adverse cardiac events is low at best and therefore nonprohibitive to proceed with surgery.    Echo  Follow-up as needed    (This note was generated with voice recognition software and may contain errors including spelling, grammar, syntax and missed recognition of what was dictated, of which may not have been fully corrected)       Carmelo Harrell MD PhD

## 2024-05-31 NOTE — PROGRESS NOTES
Patient: Ena Ridley    [id number]  : 2000 -- AGE 24 y.o.    Provider: LALI Escudero-CNP     Location [facility]   Service Date: 6/3/2024       Department of Medicine  Division of Pulmonary, Critical Care, and Sleep Medicine       [facility]  New Visit Note        HISTORY OF PRESENT ILLNESS     The patient's referring provider is: Gabriel Dasilva MD    PCP: None  Bariatrics: Dr. Gabriel Dasilva  Sleep: Dr. Nirmal Sparks    HISTORY OF PRESENT ILLNESS   Ena Ridley is a 24 y.o. female who presents to a [facility] for an evaluation with concerns of bariatric surgery status.  I have independently interviewed and examined the patient in the office and reviewed available records.    Current History  Patient presents to pulmonary clinic for evaluation of pulmonary clearance for bariatric surgery.  She is looking to get a gastric sleeve with Dr. Dasilva from bariatrics.     On today's visit, the patient reports no known pulmonary history.  She denies shortness of breath at rest or dyspnea on exertion.  Chronic cough.  Denies wheezing.  Denies orthopnea or lower leg edema.  Denies palpitations.  Denies any recent fever, sweats, chills.  Weight has been stable but can fluctuate +/- 10 pounds.  Denies GERD and she does have seasonal allergies and will use OTC Xyzal.  No history of inhaler use.    Denies premature birth. No childhood pulmonary issues growing up. No pulmonary hospitalizations. Has never been on home oxygen therapy before.    Currently following with Dr. Sparks from sleep medicine; has upcoming sleep study.    ACT Today: 25  ESS Today: 12    Prior Notes & History       REVIEW OF SYSTEMS     REVIEW OF SYSTEMS  Review of Systems   Constitutional:  Negative for activity change, appetite change, chills, fatigue, fever and unexpected weight change.   HENT:  Negative for congestion, postnasal drip, rhinorrhea, sinus  pressure, sinus pain, sneezing, sore throat, trouble swallowing and voice change.         Denies throat clearing   Eyes:  Negative for redness and itching.   Respiratory:  Negative for cough, chest tightness, shortness of breath, wheezing and stridor.    Cardiovascular:  Negative for chest pain, palpitations and leg swelling.        Denies orthopnea   Gastrointestinal:  Negative for abdominal pain, diarrhea, nausea and vomiting.        Denies acid reflux   Musculoskeletal:  Negative for arthralgias, back pain, joint swelling and myalgias.   Skin:  Negative for rash.   Allergic/Immunologic: Negative for immunocompromised state.   Neurological:  Negative for dizziness, tremors, weakness and headaches.   Hematological:  Does not bruise/bleed easily.   Psychiatric/Behavioral:  Negative for agitation and sleep disturbance. The patient is not nervous/anxious.         Denies depression   All other systems reviewed and are negative.      ALLERGIES AND MEDICATIONS     ALLERGIES  Allergies   Allergen Reactions    Egg Other     cramps    Fish Containing Products Unknown    Nickel Rash       MEDICATIONS  Current Outpatient Medications   Medication Sig Dispense Refill    cyanocobalamin (Vitamin B-12) 1,000 mcg tablet Take 1 tablet (1,000 mcg) by mouth once daily.      Daily-Briana, with folic acid, 400 mcg tablet       docusate sodium (Colace) 100 mg capsule Take 1 capsule (100 mg) by mouth once daily.      ferrous sulfate ER (Slow Iron) 140 (45 Fe) MG ER tablet Take 140 mg by mouth once daily.      medroxyPROGESTERone 150 mg/mL injection Inject 1 mL (150 mg) into the muscle.      ofloxacin (Floxin) 0.3 % otic solution INSTILL 10 DROPS IN THE AFFECTED EAR(S) EVERY 12 HOURS FOR 5 DAYS      tretinoin (Retin-A) 0.025 % cream Apply topically.      valACYclovir (Valtrex) 500 mg tablet Take 1 tablet (500 mg) by mouth once daily.       No current facility-administered medications for this visit.       PAST HISTORY     PAST MEDICAL  HISTORY  She  has no past medical history on file.    PAST SURGICAL HISTORY  Past Surgical History:   Procedure Laterality Date    NO PAST SURGERIES         IMMUNIZATION HISTORY  Immunization History   Administered Date(s) Administered    DTaP vaccine, pediatric  (INFANRIX) 2001, 2001, 2001, 04/22/2003, 03/03/2006    Flu vaccine (IIV4), preservative free *Check age/dose* 01/28/2019, 02/22/2024    HPV 9-valent vaccine (GARDASIL 9) 06/19/2018, 02/22/2024    Hepatitis A vaccine, pediatric/adolescent (HAVRIX, VAQTA) 08/20/2012, 11/26/2013    Hepatitis B vaccine, pediatric/adolescent (RECOMBIVAX, ENGERIX) 2001, 05/20/2010    HiB, unspecified 04/16/2002    Hib / Hep B 2001, 2001    Influenza, Unspecified 10/23/2003, 12/04/2003    Influenza, injectable, quadrivalent 10/28/2023    MMR vaccine, subcutaneous (MMR II) 04/16/2002, 03/03/2006    Meningococcal ACWY vaccine (MENVEO) 05/05/2017    Meningococcal ACWY-D (Menactra) 4-valent conjugate vaccine 08/20/2012    Meningococcal B vaccine (BEXSERO) 05/05/2017, 06/19/2018    PPD Test 10/23/2003, 04/21/2023    Pfizer COVID-19 vaccine, Fall 2023, 12 years and older, (30mcg/0.3mL) 02/22/2024    Pfizer COVID-19 vaccine, bivalent, age 12 years and older (30 mcg/0.3 mL) 05/09/2023    Pfizer Gray Cap SARS-CoV-2 03/02/2022    Pneumococcal Conjugate PCV 7 2001, 2001, 2001, 10/23/2003    Poliovirus vaccine, subcutaneous (IPOL) 2001, 2001, 2001, 03/03/2006    Tdap vaccine, age 7 year and older (BOOSTRIX, ADACEL) 08/20/2012, 06/11/2019    Varicella vaccine, subcutaneous (VARIVAX) 04/22/2003, 06/19/2018       SOCIAL HISTORY  She  reports that she has never smoked. She has never used smokeless tobacco. She reports that she does not currently use alcohol. She reports that she does not currently use drugs.     OCCUPATIONAL/ENVIRONMENTAL HISTORY  Previously worked as: no exposures  Currently works as: [employer]  DOES/DOES NOT:  "does not have known exposure to asbestos, silica, beryllium or inhaled metals.    FAMILY HISTORY  Family History   Problem Relation Name Age of Onset    Hypertension Mother      Obesity Mother      Heart disease Father       PHYSICAL EXAM     VITAL SIGNS: /83   Pulse 95   Ht 1.803 m (5' 11\")   Wt 128 kg (282 lb)   SpO2 99%   BMI 39.33 kg/m²      PREVIOUS WEIGHTS:  Wt Readings from Last 3 Encounters:   06/03/24 128 kg (282 lb)   05/31/24 128 kg (282 lb)   05/03/24 127 kg (279 lb 4.8 oz)       Physical Exam  Vitals reviewed.   Constitutional:       General: She is not in acute distress.     Appearance: Normal appearance. She is obese. She is not ill-appearing or toxic-appearing.   HENT:      Head: Normocephalic.      Nose: No rhinorrhea.   Cardiovascular:      Rate and Rhythm: Normal rate and regular rhythm.      Heart sounds: Normal heart sounds.   Pulmonary:      Effort: Pulmonary effort is normal. No respiratory distress.      Breath sounds: Normal breath sounds. No stridor. No wheezing, rhonchi or rales.   Abdominal:      General: Abdomen is flat.   Musculoskeletal:         General: Normal range of motion.      Right lower leg: No edema.      Left lower leg: No edema.   Skin:     General: Skin is warm and dry.      Nails: There is no clubbing.   Neurological:      General: No focal deficit present.      Mental Status: She is alert and oriented to person, place, and time.   Psychiatric:         Mood and Affect: Mood normal.         Behavior: Behavior normal.         Judgment: Judgment normal.         RESULTS/DATA     Pulmonary Function Test Results     No PFT on record    Chest Radiograph   CXR  11/8/21: IMPRESSION: 1. No evidence of acute cardiopulmonary process.    Chest CT Scan     No results found for this or any previous visit from the past 365 days.      Echocardiogram & Cardiac Studies     No results found for this or any previous visit from the past 365 days.       Labwork & Pathology   Complete " "Blood Count  Lab Results   Component Value Date    WBC 8.1 08/21/2022    HGB 10.9 (L) 08/21/2022    HCT 35.5 (L) 08/21/2022    MCV 77 (L) 08/21/2022     08/21/2022       Peripheral Eosinophil Count:   Eosinophils Absolute (x10E9/L)   Date Value   08/21/2022 0.22   05/07/2022 0.12   11/08/2021 0.07       Serum Immunoglobulin E:    No results found for: \"IGE\"     Metabolic Parameters  Sodium   Date/Time Value Ref Range Status   08/21/2022 09:30  136 - 145 mmol/L Final     Potassium   Date/Time Value Ref Range Status   08/21/2022 09:30 PM 3.8 3.5 - 5.3 mmol/L Final     Chloride   Date/Time Value Ref Range Status   08/21/2022 09:30  98 - 107 mmol/L Final     Bicarbonate   Date/Time Value Ref Range Status   08/21/2022 09:30 PM 23 21 - 32 mmol/L Final     Anion Gap   Date/Time Value Ref Range Status   08/21/2022 09:30 PM 13 10 - 20 mmol/L Final     Urea Nitrogen   Date/Time Value Ref Range Status   08/21/2022 09:30 PM 11 6 - 23 mg/dL Final     Creatinine   Date/Time Value Ref Range Status   08/21/2022 09:30 PM 0.65 0.50 - 1.05 mg/dL Final     GFR Female   Date/Time Value Ref Range Status   08/21/2022 09:30 PM >90 >90 mL/min/1.73m2 Final     Comment:      CALCULATIONS OF ESTIMATED GFR ARE PERFORMED   USING THE 2021 CKD-EPI STUDY REFIT EQUATION   WITHOUT THE RACE VARIABLE FOR THE IDMS-TRACEABLE   CREATININE METHODS.    https://jasn.asnjournals.org/content/early/2021/09/22/ASN.9464920063       Glucose   Date/Time Value Ref Range Status   08/21/2022 09:30  (H) 74 - 99 mg/dL Final     Calcium   Date/Time Value Ref Range Status   08/21/2022 09:30 PM 9.0 8.6 - 10.6 mg/dL Final     Zinc, Serum or Plasma   Date/Time Value Ref Range Status   05/03/2024 09:58 AM 55.7 (L) 60.0 - 120.0 ug/dL Final     Comment:     INTERPRETIVE INFORMATION: Zinc, Serum or Plasma    Elevated results may be due to skin or collection-related   contamination, including the use of a noncertified metal-free   collection/transport " tube. If contamination concerns exist due to   elevated levels of serum/plasma zinc, confirmation with a second   specimen collected in a certified metal-free tube is recommended.    Circulating zinc concentrations are dependent on albumin status   and are depressed with malnutrition.  Zinc may also be lowered   with infection, inflammation, stress, oral contraceptives, and   pregnancy.  Zinc may be elevated with zinc supplementation or   fasting.  Elevated zinc concentrations may interfere with copper   absorption.     This test was developed and its performance characteristics   determined by [facility]. It has not been cleared or   approved by the US Food and Drug Administration. This test was   performed in a CLIA certified laboratory and is intended for   clinical purposes.  Performed By: [facility]  [address] [place] [id number]  : Dl Garber MD, PhD  CLIA Number: [id number]     AST   Date/Time Value Ref Range Status   08/21/2022 09:30 PM 11 9 - 39 U/L Final     ALT (SGPT)   Date/Time Value Ref Range Status   08/21/2022 09:30 PM 7 7 - 45 U/L Final     Comment:      Patients treated with Sulfasalazine may generate    falsely decreased results for ALT.         Bronchoscopy & Pathology/Cultures       ASSESSMENT/PLAN     Ms. Ridley is a 24 y.o. female; was referred to the [facility] for evaluation of Bariatric surgery clearance    Problem List and Orders  Diagnoses and all orders for this visit:  Bariatric surgery status  -     Referral to Pulmonology  Encounter for preoperative pulmonary examination      Assessment and Plan / Recommendations:  Bariatric Surgery Status: looking to get sleeve gastrectomy with Dr. Dasilva. No pulmonary history. Never smoker. Asymptomatic.    Preoperative Evaluation:  Patient is at increased risk of postoperative pulmonary complications given poor general health status (American Society of  Anesthesiologists [ASA] class >2), questionable obstructive sleep apnea. However this increased risk should not preclude the surgery.    - To minimize the risk for complications we recommend the following: incentive spirometry to be started before surgery, smoking cessation for at least 8 weeks before surgery, use of CPAP machine , early ambulation, minimize sedation, DVT prophylaxis if appropriate per surgical team.   -  Can call pulmonary consult while inpatient if needed     Preoperative Risk Assessment:  --- ARISCAT score 15 pts = low risk - 1.6 % risk of in- hospital post-op pulmonary complications.   --- Per [facility] Postop Respiratory Failure Risk - Estimated risk probability for ventilator need >48 hours postop or reintubated less than/equal to 30 days 0.9 %.     RTC PRN

## 2024-06-03 ENCOUNTER — OFFICE VISIT (OUTPATIENT)
Dept: PULMONOLOGY | Facility: CLINIC | Age: 24
End: 2024-06-03
Payer: COMMERCIAL

## 2024-06-03 VITALS
DIASTOLIC BLOOD PRESSURE: 83 MMHG | BODY MASS INDEX: 39.48 KG/M2 | OXYGEN SATURATION: 99 % | HEIGHT: 71 IN | HEART RATE: 95 BPM | SYSTOLIC BLOOD PRESSURE: 125 MMHG | WEIGHT: 282 LBS

## 2024-06-03 DIAGNOSIS — Z98.84 BARIATRIC SURGERY STATUS: Primary | ICD-10-CM

## 2024-06-03 DIAGNOSIS — Z01.811 ENCOUNTER FOR PREOPERATIVE PULMONARY EXAMINATION: ICD-10-CM

## 2024-06-03 PROCEDURE — 1036F TOBACCO NON-USER: CPT | Performed by: NURSE PRACTITIONER

## 2024-06-03 PROCEDURE — 99214 OFFICE O/P EST MOD 30 MIN: CPT | Performed by: NURSE PRACTITIONER

## 2024-06-03 PROCEDURE — 99204 OFFICE O/P NEW MOD 45 MIN: CPT | Performed by: NURSE PRACTITIONER

## 2024-06-03 PROCEDURE — 3008F BODY MASS INDEX DOCD: CPT | Performed by: NURSE PRACTITIONER

## 2024-06-03 RX ORDER — OFLOXACIN 3 MG/ML
SOLUTION AURICULAR (OTIC)
COMMUNITY
Start: 2024-05-08

## 2024-06-03 ASSESSMENT — ENCOUNTER SYMPTOMS
BACK PAIN: 0
FATIGUE: 0
SINUS PRESSURE: 0
APPETITE CHANGE: 0
SHORTNESS OF BREATH: 0
VOICE CHANGE: 0
JOINT SWELLING: 0
FEVER: 0
MYALGIAS: 0
WHEEZING: 0
CHILLS: 0
NAUSEA: 0
EYE REDNESS: 0
DIZZINESS: 0
STRIDOR: 0
NERVOUS/ANXIOUS: 0
EYE ITCHING: 0
ACTIVITY CHANGE: 0
VOMITING: 0
COUGH: 0
SORE THROAT: 0
HEADACHES: 0
UNEXPECTED WEIGHT CHANGE: 0
RHINORRHEA: 0
WEAKNESS: 0
DIARRHEA: 0
BRUISES/BLEEDS EASILY: 0
SLEEP DISTURBANCE: 0
TROUBLE SWALLOWING: 0
AGITATION: 0
SINUS PAIN: 0
ARTHRALGIAS: 0
CHEST TIGHTNESS: 0
TREMORS: 0
ROS GI COMMENTS: DENIES ACID REFLUX
ABDOMINAL PAIN: 0
PALPITATIONS: 0

## 2024-06-03 ASSESSMENT — PATIENT HEALTH QUESTIONNAIRE - PHQ9
SUM OF ALL RESPONSES TO PHQ9 QUESTIONS 1 & 2: 0
1. LITTLE INTEREST OR PLEASURE IN DOING THINGS: NOT AT ALL
2. FEELING DOWN, DEPRESSED OR HOPELESS: NOT AT ALL

## 2024-06-03 ASSESSMENT — LIFESTYLE VARIABLES: HOW MANY STANDARD DRINKS CONTAINING ALCOHOL DO YOU HAVE ON A TYPICAL DAY: PATIENT DOES NOT DRINK

## 2024-06-03 ASSESSMENT — PAIN SCALES - GENERAL: PAINLEVEL: 0-NO PAIN

## 2024-06-03 NOTE — PATIENT INSTRUCTIONS
Today we discussed your pulmonary history and need for surgical clearance.    -Happy to clear you for surgery today; no pulmonary concerns. I will send Dr. Dasilva my note.    Thank you for visiting the pulmonary clinic today! It was a pleasure to participate in your care.  Please return to clinic  if needed.    Wolf Chilel, CNP  My Office Number: [phone]   CT Scheduling: [phone]  PFT/Follow Up Visit Scheduling: [phone]  My Nurse: JOSE DE JESUS Hansen  My : Nola    To reach the nurse, Jade Broussard RN, please call [phone]. Jade has a secure voice mail account if you want to leave a message.    **For immediate needs such as medication issues/refills, active sick symptoms/medical concerns; I ask that you please call the office and speak to the pulmonary nurse. MyChart messages do not come directly to me. There can sometimes be a delay before I am aware of any messages that were sent. Thank you.**

## 2024-06-05 ENCOUNTER — CLINICAL SUPPORT (OUTPATIENT)
Dept: SLEEP MEDICINE | Facility: CLINIC | Age: 24
End: 2024-06-05
Payer: COMMERCIAL

## 2024-06-05 VITALS
DIASTOLIC BLOOD PRESSURE: 90 MMHG | BODY MASS INDEX: 38.22 KG/M2 | HEIGHT: 72 IN | SYSTOLIC BLOOD PRESSURE: 128 MMHG | WEIGHT: 282.19 LBS

## 2024-06-05 DIAGNOSIS — G47.9 SLEEP DISORDER, UNSPECIFIED: ICD-10-CM

## 2024-06-05 DIAGNOSIS — Z98.84 BARIATRIC SURGERY STATUS: ICD-10-CM

## 2024-06-05 PROCEDURE — 95810 POLYSOM 6/> YRS 4/> PARAM: CPT | Performed by: GENERAL PRACTICE

## 2024-06-05 ASSESSMENT — SLEEP AND FATIGUE QUESTIONNAIRES
HOW LIKELY ARE YOU TO NOD OFF OR FALL ASLEEP IN A CAR, WHILE STOPPED FOR A FEW MINUTES IN TRAFFIC: WOULD NEVER DOZE
HOW LIKELY ARE YOU TO NOD OFF OR FALL ASLEEP WHILE SITTING QUIETLY AFTER LUNCH WITHOUT ALCOHOL: WOULD NEVER DOZE
SITING INACTIVE IN A PUBLIC PLACE LIKE A CLASS ROOM OR A MOVIE THEATER: WOULD NEVER DOZE
ESS-CHAD TOTAL SCORE: 8
HOW LIKELY ARE YOU TO NOD OFF OR FALL ASLEEP WHILE SITTING AND READING: SLIGHT CHANCE OF DOZING
HOW LIKELY ARE YOU TO NOD OFF OR FALL ASLEEP WHEN YOU ARE A PASSENGER IN A CAR FOR AN HOUR WITHOUT A BREAK: HIGH CHANCE OF DOZING
HOW LIKELY ARE YOU TO NOD OFF OR FALL ASLEEP WHILE WATCHING TV: HIGH CHANCE OF DOZING
HOW LIKELY ARE YOU TO NOD OFF OR FALL ASLEEP WHILE LYING DOWN TO REST IN THE AFTERNOON WHEN CIRCUMSTANCES PERMIT: SLIGHT CHANCE OF DOZING
HOW LIKELY ARE YOU TO NOD OFF OR FALL ASLEEP WHILE SITTING AND TALKING TO SOMEONE: WOULD NEVER DOZE

## 2024-06-06 NOTE — PROGRESS NOTES
[facility] TECH NOTE:     Patient: Ena Ridley   MRN//AGE: [id number]  2000  24 y.o.   Technologist: DENIS Camarena   Room: 3   Service Date: 2024        Sleep Testing Location: [facility]:     TECHNOLOGIST SLEEP STUDY PROCEDURE NOTE:   This sleep study is being conducted according to the policies and procedures outlined by the AAS accreditation standards.  The sleep study procedure and processes involved during this appointment was explained to the patient/patient’s family, questions were answered. The patient/family verbalized understanding.      The patient is a 24 y.o. year old female scheduled for aDiagnostic PSG Split night with montage of:  standard . she arrived for her appointment.      The study that was ultimately completed was aDiagnostic PSG Split night with montage of:  standard .    The full study Was completed.  Patient questionnaires completed?: yes     Consents signed? yes    Initial Fall Risk Screening:     Ena has not fallen in the last 6 months. her did not result in injury. Ena does not have a fear of falling. He does not need assistance with sitting, standing, or walking. she does not need assistance walking in her home. she does not need assistance in an unfamiliar setting. The patient is notusing an assistive device.     Brief Study observations: A diagnostic PSG study was performed.     Deviation to order/protocol and reason: The patient did not meet the AHI criteria, so CPAP was not introduced.      If PAP, which was preferred mask/pressure/mode: NA      Other:None    After the procedure, the patient/family was informed to ensure followup with ordering clinician for testing results.      Technologist: DENIS Camarena

## 2024-06-08 ENCOUNTER — APPOINTMENT (OUTPATIENT)
Dept: SLEEP MEDICINE | Facility: CLINIC | Age: 24
End: 2024-06-08
Payer: COMMERCIAL

## 2024-06-10 ENCOUNTER — TELEPHONE (OUTPATIENT)
Dept: SURGERY | Facility: CLINIC | Age: 24
End: 2024-06-10
Payer: COMMERCIAL

## 2024-06-10 NOTE — TELEPHONE ENCOUNTER
Outgoing call made to patient (received my chart message)  Patient stated that she did not receive green folder and she does not know how to reach out to [facility] for psych.  Provided her direct line to dept. Also, she said that she just had sleep study, told her to reach out to provider to review notes. If CPAP is needed we will need 30 day compliance.   Told her if she needs anything else, to please call.  
STG (3-5 sessions) sup to sit/ sit to sup independent

## 2024-06-13 ENCOUNTER — TELEPHONE (OUTPATIENT)
Dept: SURGERY | Facility: CLINIC | Age: 24
End: 2024-06-13

## 2024-06-17 ENCOUNTER — TELEPHONE (OUTPATIENT)
Dept: SURGERY | Facility: CLINIC | Age: 24
End: 2024-06-17
Payer: COMMERCIAL

## 2024-06-21 ENCOUNTER — TELEPHONE (OUTPATIENT)
Dept: SURGERY | Facility: CLINIC | Age: 24
End: 2024-06-21
Payer: COMMERCIAL

## 2024-06-21 NOTE — TELEPHONE ENCOUNTER
Outgoing call made to patient, left vm. Wanted to go over clearances with her and to remind her that she needs to reschedule with RD.

## 2024-06-24 ENCOUNTER — HOSPITAL ENCOUNTER (EMERGENCY)
Facility: HOSPITAL | Age: 24
Discharge: HOME | End: 2024-06-24
Payer: COMMERCIAL

## 2024-06-24 VITALS
OXYGEN SATURATION: 97 % | HEIGHT: 71 IN | DIASTOLIC BLOOD PRESSURE: 86 MMHG | BODY MASS INDEX: 37.8 KG/M2 | TEMPERATURE: 98.1 F | SYSTOLIC BLOOD PRESSURE: 140 MMHG | WEIGHT: 270 LBS | RESPIRATION RATE: 18 BRPM | HEART RATE: 77 BPM

## 2024-06-24 DIAGNOSIS — R10.13 EPIGASTRIC PAIN: ICD-10-CM

## 2024-06-24 DIAGNOSIS — N76.0 BACTERIAL VAGINOSIS: Primary | ICD-10-CM

## 2024-06-24 DIAGNOSIS — B96.89 BACTERIAL VAGINOSIS: Primary | ICD-10-CM

## 2024-06-24 LAB
APPEARANCE UR: CLEAR
BILIRUB UR STRIP.AUTO-MCNC: NEGATIVE MG/DL
C TRACH RRNA SPEC QL NAA+PROBE: NEGATIVE
CLUE CELLS SPEC QL WET PREP: PRESENT
COLOR UR: YELLOW
GLUCOSE UR STRIP.AUTO-MCNC: NORMAL MG/DL
HOLD SPECIMEN: NORMAL
KETONES UR STRIP.AUTO-MCNC: ABNORMAL MG/DL
LEUKOCYTE ESTERASE UR QL STRIP.AUTO: NEGATIVE
MUCOUS THREADS #/AREA URNS AUTO: NORMAL /LPF
N GONORRHOEA DNA SPEC QL PROBE+SIG AMP: NEGATIVE
NITRITE UR QL STRIP.AUTO: NEGATIVE
PH UR STRIP.AUTO: 6 [PH]
PREGNANCY TEST URINE, POC: NEGATIVE
PROT UR STRIP.AUTO-MCNC: ABNORMAL MG/DL
RBC # UR STRIP.AUTO: NEGATIVE /UL
RBC #/AREA URNS AUTO: NORMAL /HPF
SP GR UR STRIP.AUTO: 1.03
SQUAMOUS #/AREA URNS AUTO: NORMAL /HPF
T VAGINALIS SPEC QL WET PREP: ABNORMAL
TRICHOMONAS REFLEX COMMENT: ABNORMAL
UROBILINOGEN UR STRIP.AUTO-MCNC: NORMAL MG/DL
WBC #/AREA URNS AUTO: NORMAL /HPF
WBC VAG QL WET PREP: ABNORMAL
YEAST VAG QL WET PREP: ABNORMAL

## 2024-06-24 PROCEDURE — 99284 EMERGENCY DEPT VISIT MOD MDM: CPT

## 2024-06-24 PROCEDURE — 81003 URINALYSIS AUTO W/O SCOPE: CPT

## 2024-06-24 PROCEDURE — 87661 TRICHOMONAS VAGINALIS AMPLIF: CPT

## 2024-06-24 PROCEDURE — 87210 SMEAR WET MOUNT SALINE/INK: CPT

## 2024-06-24 PROCEDURE — 81025 URINE PREGNANCY TEST: CPT | Performed by: EMERGENCY MEDICINE

## 2024-06-24 PROCEDURE — 87491 CHLMYD TRACH DNA AMP PROBE: CPT

## 2024-06-24 PROCEDURE — 2500000004 HC RX 250 GENERAL PHARMACY W/ HCPCS (ALT 636 FOR OP/ED): Mod: SE

## 2024-06-24 PROCEDURE — 2500000001 HC RX 250 WO HCPCS SELF ADMINISTERED DRUGS (ALT 637 FOR MEDICARE OP): Mod: SE

## 2024-06-24 PROCEDURE — 99283 EMERGENCY DEPT VISIT LOW MDM: CPT

## 2024-06-24 PROCEDURE — 96372 THER/PROPH/DIAG INJ SC/IM: CPT

## 2024-06-24 RX ORDER — METRONIDAZOLE 500 MG/1
500 TABLET ORAL 2 TIMES DAILY
Qty: 14 TABLET | Refills: 0 | Status: SHIPPED | OUTPATIENT
Start: 2024-06-24 | End: 2024-07-01

## 2024-06-24 RX ORDER — FAMOTIDINE 10 MG/1
10 TABLET ORAL 2 TIMES DAILY
Qty: 60 TABLET | Refills: 0 | Status: SHIPPED | OUTPATIENT
Start: 2024-06-24 | End: 2025-06-24

## 2024-06-24 RX ORDER — FAMOTIDINE 40 MG/1
20 TABLET, FILM COATED ORAL ONCE
Status: COMPLETED | OUTPATIENT
Start: 2024-06-24 | End: 2024-06-24

## 2024-06-24 RX ORDER — KETOROLAC TROMETHAMINE 30 MG/ML
30 INJECTION, SOLUTION INTRAMUSCULAR; INTRAVENOUS ONCE
Status: COMPLETED | OUTPATIENT
Start: 2024-06-24 | End: 2024-06-24

## 2024-06-24 RX ORDER — METRONIDAZOLE 500 MG/1
500 TABLET ORAL ONCE
Status: COMPLETED | OUTPATIENT
Start: 2024-06-24 | End: 2024-06-24

## 2024-06-24 ASSESSMENT — PAIN SCALES - GENERAL: PAINLEVEL_OUTOF10: 0 - NO PAIN

## 2024-06-24 ASSESSMENT — PAIN - FUNCTIONAL ASSESSMENT: PAIN_FUNCTIONAL_ASSESSMENT: 0-10

## 2024-06-24 NOTE — ED PROVIDER NOTES
"HPI   Chief Complaint   Patient presents with    Possible Pregnancy       Patient is a 24-year-old female with no significant past medical history presenting to the ED with concerns of possibly being pregnant.  Patient states she has felt \"movement\" in her abdomen since yesterday.  She states she feels as though something is pushing out against her stomach in the LUQ.  She endorses feelings of nausea without vomiting.  When asked if her symptoms are aggravated by oral intake, patient states she is unsure.  In addition to the symptoms, patient admits to vaginal mucus consisting of a whitish green discharge which occurred once last week and then again yesterday.  She states she is sexually active with a male partner.  She is not concerned about STDs, but is agreeable to be tested.  Patient does state she had a medication induced  in December.  She states she never followed up afterwards with an OB/GYN provider.  She also states her menses has not returned since that time.  Patient otherwise denies any fever/chills, flulike symptoms, chest pain, shortness of breath, changes in urinary/bowel habits, vaginal pain/irritation, or abnormal vaginal bleeding.  She denies any history of abdominal surgery.                Lida Coma Scale Score: 15                     Patient History   No past medical history on file.  Past Surgical History:   Procedure Laterality Date    NO PAST SURGERIES       Family History   Problem Relation Name Age of Onset    Hypertension Mother      Obesity Mother      Heart disease Father       Social History     Tobacco Use    Smoking status: Never    Smokeless tobacco: Never   Vaping Use    Vaping status: Never Used   Substance Use Topics    Alcohol use: Not Currently    Drug use: Not Currently       Physical Exam   ED Triage Vitals [24 0053]   Temperature Heart Rate Respirations BP   36.2 °C (97.2 °F) 94 18 123/86      Pulse Ox Temp Source Heart Rate Source Patient Position   99 % " "Temporal -- --      BP Location FiO2 (%)     -- --       Physical Exam  Vitals reviewed. Exam conducted with a chaperone present.   Constitutional:       General: She is not in acute distress.     Appearance: She is not ill-appearing.   Cardiovascular:      Rate and Rhythm: Normal rate and regular rhythm.   Pulmonary:      Effort: Pulmonary effort is normal. No respiratory distress.      Breath sounds: Normal breath sounds.   Abdominal:      General: Bowel sounds are normal.      Palpations: Abdomen is soft.      Tenderness: There is no guarding or rebound.      Comments: \"Discomfort\" to palpation in LUQ   Genitourinary:     Vagina: Normal. No vaginal discharge, tenderness, bleeding or lesions.      Cervix: No cervical motion tenderness, friability, lesion or cervical bleeding.      Uterus: Normal.       Adnexa: Right adnexa normal and left adnexa normal.        Right: No mass, tenderness or fullness.          Left: No mass, tenderness or fullness.     Skin:     General: Skin is warm and dry.   Neurological:      General: No focal deficit present.      Mental Status: She is alert.         ED Course & MDM   Diagnoses as of 06/24/24 0605   Bacterial vaginosis   Epigastric pain       Labs Reviewed   TRICHOMONAS WET PREP REFLEX TO PCR - Abnormal       Result Value    Trichomonas None Seen      Clue Cells Present (*)     Yeast None Seen      WBC 11-20      Trichomonas reflex comment        Value: Trichomonas was not seen by wet prep. Reflex Trichomonas vaginalis by Amplified Detection.   URINALYSIS WITH REFLEX CULTURE AND MICROSCOPIC - Abnormal    Color, Urine Yellow      Appearance, Urine Clear      Specific Gravity, Urine 1.032      pH, Urine 6.0      Protein, Urine 20 (TRACE)      Glucose, Urine Normal      Blood, Urine NEGATIVE      Ketones, Urine TRACE (*)     Bilirubin, Urine NEGATIVE      Urobilinogen, Urine Normal      Nitrite, Urine NEGATIVE      Leukocyte Esterase, Urine NEGATIVE     POCT PREGNANCY, URINE - " "Abnormal    Preg Test, Ur Negative (*)    C. TRACHOMATIS + N. GONORRHOEAE, AMPLIFIED   URINALYSIS WITH REFLEX CULTURE AND MICROSCOPIC    Narrative:     The following orders were created for panel order Urinalysis with Reflex Culture and Microscopic.  Procedure                               Abnormality         Status                     ---------                               -----------         ------                     Urinalysis with Reflex C...[[id number]]  Abnormal            Final result               Extra Urine Gray Tube[[id number]]                            In process                   Please view results for these tests on the individual orders.   EXTRA URINE GRAY TUBE   TRICH VAGINALIS, AMPLIFIED   URINALYSIS MICROSCOPIC WITH REFLEX CULTURE    WBC, Urine 1-5      RBC, Urine 1-2      Squamous Epithelial Cells, Urine 1-9 (SPARSE)      Mucus, Urine 4+         Medical Decision Making  Patient is a 24-year-old female with no significant past medical history presenting to the ED with concerns of possibly being pregnant.  History was obtained from the patient.  Endorses \"movement\" in the LUQ of her abdomen since yesterday.  Also endorses nausea without vomiting.  Cannot pinpoint any provocative or palliative factors.  Also endorses a whitish green vaginal discharge occurring once last week and then again yesterday.  Is sexually active with a male partner.  In addition, patient had a medication induced  in December and menses has not returned since.  Patient did not follow-up with an OB/GYN provider following the .  On physical exam, she is sitting up comfortably and in no apparent distress.  Lungs CTA bilaterally without increased work of breathing.  Abdomen is soft with normal bowel sounds.  She does endorse some \"discomfort\" to palpation in the LUQ.  No rebound, guarding, or peritoneal signs.  Pelvic exam unremarkable.  Remainder of exam as noted above.  Patient is afebrile and vital signs are " stable.    Patient's urine pregnancy test negative.  Her LUQ abdominal pain appears very likely secondary to gastritis.  Upon further conversation, patient does state her symptoms are aggravated with oral intake.  This would further back that diagnosis.  I have low suspicion for other acute causes of LUQ pain such as diverticulitis, pancreatitis, or mesenteric ischemia.  Will defer labs or imaging at this time.  Her vaginal discharge does raise concern for BV or other STDs.  Will obtain a urinalysis and STD swabs.  Patient was treated with a dose of Toradol and Pepcid.  Her urinalysis was without evidence of infection.  Wet prep positive for clue cells and she received a dose of Flagyl.  Upon reassessment, patient endorses improvement in her symptoms.  She does again state her symptoms are aggravated with oral intake.  She is seen both eating and drinking in the waiting room as well as in the ED.  Patient has remained stable and ready for discharge at this time.  She will be discharged with a prescription for Flagyl for her BV as well as Pepcid for her likely gastritis.  I did instruct her on the importance of following up with an OB/GYN provider within the coming weeks.  Patient verbalizes understanding and is in agreement with this plan.  She was educated on signs and symptoms that would warrant returning to this ED.  All of her questions were answered to satisfaction she was discharged from the ED in stable condition.        Procedure  Procedures     Brook Marion PA-C  06/24/24 [id number]

## 2024-06-24 NOTE — ED TRIAGE NOTES
had a medical . Today, she felt as if there was a baby kicking in her stomach. All her pregnancy tests come back negative. She has not had a period since before the .

## 2024-06-24 NOTE — DISCHARGE INSTRUCTIONS
Your urinalysis did not show evidence of an infection.  The STD swabs did show bacterial infection.  You received the first dose of an antibiotic called Flagyl here in the ED.  I also sent this to your [facility] pharmacy.  Please take this twice daily for the next 7 days.  In terms of your abdominal discomfort, this is likely related to gastritis or gastric reflux.  You received a dose of a medication called Pepcid which helps with those symptoms here in the ED.  I also sent this to your [facility] pharmacy.  You can take 1 tablet twice daily as needed for the symptoms.  You can also take Tylenol or ibuprofen as needed for any kind of abdominal pain.  In addition, I would like you to follow-up with our women's health clinic.  Please call [phone] in the coming days to schedule a follow-up visit within the next week or 2.

## 2024-06-25 LAB — T VAGINALIS RRNA SPEC QL NAA+PROBE: NEGATIVE

## 2024-06-26 ENCOUNTER — OFFICE VISIT (OUTPATIENT)
Dept: PRIMARY CARE | Facility: CLINIC | Age: 24
End: 2024-06-26
Payer: COMMERCIAL

## 2024-06-26 VITALS
DIASTOLIC BLOOD PRESSURE: 68 MMHG | HEART RATE: 92 BPM | BODY MASS INDEX: 39.11 KG/M2 | OXYGEN SATURATION: 93 % | HEIGHT: 71 IN | WEIGHT: 279.38 LBS | TEMPERATURE: 97.8 F | SYSTOLIC BLOOD PRESSURE: 122 MMHG

## 2024-06-26 DIAGNOSIS — Z71.3 ENCOUNTER FOR WEIGHT LOSS COUNSELING: Primary | ICD-10-CM

## 2024-06-26 DIAGNOSIS — K21.9 GASTROESOPHAGEAL REFLUX DISEASE WITHOUT ESOPHAGITIS: ICD-10-CM

## 2024-06-26 PROCEDURE — 99214 OFFICE O/P EST MOD 30 MIN: CPT | Performed by: NURSE PRACTITIONER

## 2024-06-26 PROCEDURE — 3008F BODY MASS INDEX DOCD: CPT | Performed by: NURSE PRACTITIONER

## 2024-06-26 RX ORDER — OMEPRAZOLE 40 MG/1
40 CAPSULE, DELAYED RELEASE ORAL
COMMUNITY
Start: 2024-06-25

## 2024-06-26 ASSESSMENT — PATIENT HEALTH QUESTIONNAIRE - PHQ9
2. FEELING DOWN, DEPRESSED OR HOPELESS: NOT AT ALL
1. LITTLE INTEREST OR PLEASURE IN DOING THINGS: NOT AT ALL
SUM OF ALL RESPONSES TO PHQ9 QUESTIONS 1 AND 2: 0

## 2024-06-26 ASSESSMENT — PAIN SCALES - GENERAL: PAINLEVEL: 0-NO PAIN

## 2024-06-26 NOTE — PROGRESS NOTES
"Subjective   Patient ID: Ena Ridley is a 24 y.o. female who presents for New Patient Visit (Establish care ) and Weight Loss (Discuss wt loss injections ).    HPI   This is a pleasant 23 y/o female who presents with request to start weight loss injections.  PCP none  Had been following with Bariatric Clinic that reportedly closed. Last visit 5/03/2024  She has been evaluated by Pulmonology, Cardiology and Sleep Medicine. Reports her clearance does not transfer to the Clinic?    Reports has tried and failed multiple diets, Has trialed AdiPex with minimal results    Diet, avoids red meat and pork  B Cream of wheat  L 2 eggs, cheese, oranges  D grilled chicken wrap, ranch  Caffeine no, diet pop  Water 32 oz x 3-4  Exercise, walks with her son, age 4    Discussed medications, benefits and risks, denies personal or family history of medullary thyroid cancer, MEN, denies diabetes.   Discussed healthy lifestyle, increasing exercise, Nutritionist referral  Due for an Annual exam    Gerd- reports presented to the ED with c/o heartburn, prescribed Pepcid with good results. Discussed triggers      Review of Systems  Review of Systems   Constitutional: Negative.    HENT: Negative.     Respiratory: Negative.     Cardiovascular: Negative.    Gastrointestinal: Negative.    Genitourinary: Negative.    Musculoskeletal: Negative.    Psychiatric/Behavioral: Negative.     All other systems reviewed and are negative.    Objective   /68 (BP Location: Left arm, Patient Position: Sitting)   Pulse 92   Temp 36.6 °C (97.8 °F)   Ht 1.803 m (5' 11\")   Wt 127 kg (279 lb 6.1 oz)   LMP 06/11/2024   SpO2 93%   BMI 38.97 kg/m²     Physical Exam  Physical Exam  Vitals reviewed.   Constitutional:       General: She is active.   HENT:      Head: Normocephalic and atraumatic.   Cardiovascular:      Rate and Rhythm: Normal rate and regular rhythm.   Pulmonary:      Effort: Pulmonary effort is normal.      Breath sounds: Normal breath " sounds.   Musculoskeletal:         General: Normal range of motion.      Cervical back: Neck supple.   Neurological:      General: No focal deficit present.      Mental Status: She is alert.     Assessment/Plan   Problem List Items Addressed This Visit             ICD-10-CM    Encounter for weight loss counseling - Primary Z71.3    Relevant Orders    Referral to Nutrition Services    Gastroesophageal reflux disease without esophagitis K21.9     Identify triggers  PPI  Handouts provided and reviewed with patient, discussed

## 2024-06-26 NOTE — PATIENT INSTRUCTIONS
Return for Fasting Labs  Nothing to eat or drink for 10 hours. Black coffee, black tea or water is ok    GERD or acid reflux occurs when stomach contents back up into the esophagus.Symptoms can include heartburn, chest pain, pain with swallowing, stomach pain, nausea/vomiting, and a sense of a lump in the throat.   Treatment can include medication and/or lifestyle changes such as;   Losing weight if you are overweight  Avoiding foods that trigger symptoms such as caffeine, chocolate, alcohol, citrus, fatty foods, spicy foods, chocolate  Quitting smoking, saliva helps neutralize refluxed acid but  smoking reduces the amount of saliva in the mouth and throat.It also causes coughing which aggravates reflux.  Avoid late meals  Avoid tight clothing around the abdomen  Raise the head of your bed 6 to 8 inches  Avoid laying down 2 hours from mealtime    The best way to lose weight is to use up more calories than you consume each day. The first step is determining how many calories you need each day and how many calories you are actually consuming. "[employer] and other free apps can help you to track your calories to see what you are eating and what high calorie foods you should be limiting. When decreasing high calorie foods you should also increase low calorie foods and water so that you stay full. Good sources include non starchy vegetables like lettuce and cucumber, high water fruits like watermelon. Avoid animal products or eat low fat versions of milk, cheeses, yogurt.  Daily exercise will help to burn calories as well which will speed up weight loss when following a low calorie diet. Walking, biking, swimming are all good exercises. Aim for 15-30 minutes each day. Adding weights 3 x a week help to build or maintain muscle and protect bone health. An added bonus is you continue to burn calories after you finish your weight workout!

## 2024-06-27 ENCOUNTER — TELEPHONE (OUTPATIENT)
Dept: SURGERY | Facility: CLINIC | Age: 24
End: 2024-06-27
Payer: COMMERCIAL

## 2024-07-08 ENCOUNTER — TELEPHONE (OUTPATIENT)
Dept: SURGERY | Facility: CLINIC | Age: 24
End: 2024-07-08
Payer: COMMERCIAL

## 2024-07-08 NOTE — TELEPHONE ENCOUNTER
Attempted to reach patient to discuss clearances. Left details voicemail. Per [facility] policy update, all clearances must be completed within 6 months.  Given this, her psych clearance is now .  I advised we can try to submit as is once she finishes her other clearances, but they will likely deny and she will need to repeat psych anyway.  Advised to call Dr. Rodríguez's office to schedule a follow up.  Patient also will need to call sleep medicine to schedule a visit to review her sleep study results/determine if she needs a CPAP.  Patient also missed her RD follow up and has not called our office back.  Urged patient to please call us ASAP.  Her last visit with PCP was asking for weight loss shots, asked patient to call to confirm if she is still interested in pursuing surgery.

## 2024-07-12 ENCOUNTER — APPOINTMENT (OUTPATIENT)
Dept: PRIMARY CARE | Facility: CLINIC | Age: 24
End: 2024-07-12
Payer: COMMERCIAL

## 2024-08-05 ENCOUNTER — TELEPHONE (OUTPATIENT)
Dept: SURGERY | Facility: CLINIC | Age: 24
End: 2024-08-05
Payer: COMMERCIAL

## 2024-08-05 NOTE — TELEPHONE ENCOUNTER
Call made to patient.  Left vm, letting her know that I am just following up with her regarding her clearances. To please call

## 2024-08-13 ENCOUNTER — TELEPHONE (OUTPATIENT)
Dept: SURGERY | Facility: CLINIC | Age: 24
End: 2024-08-13
Payer: COMMERCIAL

## 2024-08-13 NOTE — TELEPHONE ENCOUNTER
Many messages left to reschedule with RD  8/13 Left message to reschedule with RD  7/8 Melita sent message  6/27 Left message to reschedule  6/21 Madelin left message for patient  6/17  Left message to call to reschedule  6/13 Left message to call to reschedule

## 2024-08-23 ENCOUNTER — TELEPHONE (OUTPATIENT)
Dept: SURGERY | Facility: CLINIC | Age: 24
End: 2024-08-23
Payer: COMMERCIAL

## 2024-08-23 NOTE — TELEPHONE ENCOUNTER
Outbound call in an attempt to see if the patient is still interested.  No answer, left voicemail.  Asked patient to please call her Madelin whitlock, at [phone] to let her know either way if she is still interested in pursuing surgery at this time or not.  I did advise in my voicemail that if we do not hear back from her by 9/23/24 she will be considered a drop from the program.

## 2024-09-05 ENCOUNTER — APPOINTMENT (OUTPATIENT)
Dept: PRIMARY CARE | Facility: CLINIC | Age: 24
End: 2024-09-05
Payer: COMMERCIAL

## 2024-09-06 ENCOUNTER — TELEPHONE (OUTPATIENT)
Dept: SURGERY | Facility: CLINIC | Age: 24
End: 2024-09-06
Payer: COMMERCIAL

## 2024-09-19 ENCOUNTER — TELEPHONE (OUTPATIENT)
Dept: SURGERY | Facility: CLINIC | Age: 24
End: 2024-09-19
Payer: COMMERCIAL

## 2024-09-23 ENCOUNTER — TELEPHONE (OUTPATIENT)
Dept: SURGERY | Facility: CLINIC | Age: 24
End: 2024-09-23
Payer: COMMERCIAL

## 2024-09-23 NOTE — TELEPHONE ENCOUNTER
Outbound call to patient to see if she is still interested in the bariatric program at .  There was no answer and patient has not returned our multiple attempts at communication via phone and [employer].  For this reason, patient was informed via voicemail that she is considered a drop from our program.  Provided her with Madelin's contact information if she is still interested and let her know we'd be more than happy to get her back on track if she is.

## 2024-12-13 ENCOUNTER — APPOINTMENT (OUTPATIENT)
Dept: PRIMARY CARE | Facility: CLINIC | Age: 24
End: 2024-12-13
Payer: COMMERCIAL

## 2025-07-25 ENCOUNTER — APPOINTMENT (OUTPATIENT)
Dept: OPHTHALMOLOGY | Facility: CLINIC | Age: 25
End: 2025-07-25
Payer: COMMERCIAL

## 2025-07-26 ENCOUNTER — APPOINTMENT (OUTPATIENT)
Dept: OPHTHALMOLOGY | Facility: CLINIC | Age: 25
End: 2025-07-26
Payer: COMMERCIAL

## 2025-08-21 ENCOUNTER — APPOINTMENT (OUTPATIENT)
Dept: OPHTHALMOLOGY | Facility: CLINIC | Age: 25
End: 2025-08-21
Payer: COMMERCIAL

## 2025-08-21 DIAGNOSIS — H52.203 MYOPIA OF BOTH EYES WITH ASTIGMATISM: Primary | ICD-10-CM

## 2025-08-21 DIAGNOSIS — H52.13 MYOPIA OF BOTH EYES WITH ASTIGMATISM: Primary | ICD-10-CM

## 2025-08-21 PROCEDURE — 92004 COMPRE OPH EXAM NEW PT 1/>: CPT | Performed by: OPTOMETRIST

## 2025-08-21 PROCEDURE — 92015 DETERMINE REFRACTIVE STATE: CPT | Performed by: OPTOMETRIST

## 2025-08-21 ASSESSMENT — REFRACTION_MANIFEST
OS_SPHERE: -2.75
OS_AXIS: 002
OD_CYLINDER: -1.75
OD_SPHERE: -1.50
OS_CYLINDER: -2.25
OS_CYLINDER: -2.50
OD_CYLINDER: -1.75
OS_AXIS: 020
OD_SPHERE: -1.25
OS_AXIS: 002
OD_AXIS: 018
OD_AXIS: 010
METHOD_AUTOREFRACTION: 1
OD_SPHERE: -1.75
OS_SPHERE: -2.50
OD_CYLINDER: -1.75
OS_CYLINDER: -2.50
OS_SPHERE: -3.00
OD_AXIS: 015

## 2025-08-21 ASSESSMENT — VISUAL ACUITY
OD_CC: 20/30
METHOD: SNELLEN - LINEAR
CORRECTION_TYPE: GLASSES
OS_CC: 20/25

## 2025-08-21 ASSESSMENT — EXTERNAL EXAM - RIGHT EYE: OD_EXAM: NORMAL

## 2025-08-21 ASSESSMENT — REFRACTION_WEARINGRX
OS_CYLINDER: -2.00
OS_AXIS: 015
OD_SPHERE: -2.00
OD_AXIS: 023
OD_CYLINDER: -1.75
OS_SPHERE: -3.00

## 2025-08-21 ASSESSMENT — CONF VISUAL FIELD
OD_INFERIOR_NASAL_RESTRICTION: 0
OS_SUPERIOR_TEMPORAL_RESTRICTION: 0
OS_NORMAL: 1
METHOD: COUNTING FINGERS
OS_INFERIOR_TEMPORAL_RESTRICTION: 0
OD_INFERIOR_TEMPORAL_RESTRICTION: 0
OS_INFERIOR_NASAL_RESTRICTION: 0
OD_SUPERIOR_TEMPORAL_RESTRICTION: 0
OD_NORMAL: 1
OD_SUPERIOR_NASAL_RESTRICTION: 0
OS_SUPERIOR_NASAL_RESTRICTION: 0

## 2025-08-21 ASSESSMENT — ENCOUNTER SYMPTOMS: EYES NEGATIVE: 1

## 2025-08-21 ASSESSMENT — TONOMETRY
IOP_METHOD: GOLDMANN APPLANATION
OS_IOP_MMHG: 19
OD_IOP_MMHG: 17

## 2025-08-21 ASSESSMENT — SLIT LAMP EXAM - LIDS
COMMENTS: NORMAL
COMMENTS: NORMAL

## 2025-08-21 ASSESSMENT — CUP TO DISC RATIO
OD_RATIO: 0.3
OS_RATIO: 0.25

## 2025-08-21 ASSESSMENT — EXTERNAL EXAM - LEFT EYE: OS_EXAM: NORMAL

## 2026-08-27 ENCOUNTER — APPOINTMENT (OUTPATIENT)
Dept: OPHTHALMOLOGY | Facility: CLINIC | Age: 26
End: 2026-08-27
Payer: COMMERCIAL